# Patient Record
Sex: MALE | Race: BLACK OR AFRICAN AMERICAN | Employment: FULL TIME | URBAN - METROPOLITAN AREA
[De-identification: names, ages, dates, MRNs, and addresses within clinical notes are randomized per-mention and may not be internally consistent; named-entity substitution may affect disease eponyms.]

---

## 2018-12-10 ENCOUNTER — OFFICE VISIT (OUTPATIENT)
Dept: INTERNAL MEDICINE | Age: 48
End: 2018-12-10
Payer: MEDICARE

## 2018-12-10 VITALS
WEIGHT: 215.2 LBS | SYSTOLIC BLOOD PRESSURE: 192 MMHG | BODY MASS INDEX: 33.78 KG/M2 | TEMPERATURE: 98.5 F | HEART RATE: 103 BPM | DIASTOLIC BLOOD PRESSURE: 100 MMHG | HEIGHT: 67 IN | RESPIRATION RATE: 16 BRPM

## 2018-12-10 DIAGNOSIS — E78.00 HYPERCHOLESTEREMIA: ICD-10-CM

## 2018-12-10 DIAGNOSIS — E08.00 DIABETES MELLITUS DUE TO UNDERLYING CONDITION WITH HYPEROSMOLARITY WITHOUT COMA, WITHOUT LONG-TERM CURRENT USE OF INSULIN (HCC): Primary | ICD-10-CM

## 2018-12-10 DIAGNOSIS — I10 HYPERTENSION, UNSPECIFIED TYPE: ICD-10-CM

## 2018-12-10 DIAGNOSIS — G47.33 OSA (OBSTRUCTIVE SLEEP APNEA): ICD-10-CM

## 2018-12-10 DIAGNOSIS — M25.619 LIMITED RANGE OF MOTION OF SHOULDER: ICD-10-CM

## 2018-12-10 LAB — HBA1C MFR BLD: 6.4 %

## 2018-12-10 PROCEDURE — 99204 OFFICE O/P NEW MOD 45 MIN: CPT | Performed by: INTERNAL MEDICINE

## 2018-12-10 PROCEDURE — 99202 OFFICE O/P NEW SF 15 MIN: CPT | Performed by: INTERNAL MEDICINE

## 2018-12-10 PROCEDURE — 83036 HEMOGLOBIN GLYCOSYLATED A1C: CPT | Performed by: INTERNAL MEDICINE

## 2018-12-10 RX ORDER — HYDROCHLOROTHIAZIDE 25 MG/1
25 TABLET ORAL DAILY
Qty: 30 TABLET | Refills: 2 | Status: SHIPPED | OUTPATIENT
Start: 2018-12-10 | End: 2019-02-26 | Stop reason: SDUPTHER

## 2018-12-10 RX ORDER — AMLODIPINE BESYLATE 10 MG/1
10 TABLET ORAL DAILY
Qty: 30 TABLET | Refills: 2 | Status: SHIPPED | OUTPATIENT
Start: 2018-12-10 | End: 2019-02-26 | Stop reason: SDUPTHER

## 2018-12-10 ASSESSMENT — ENCOUNTER SYMPTOMS
SORE THROAT: 0
NAUSEA: 0
VOMITING: 0
SHORTNESS OF BREATH: 0
COUGH: 0
DIARRHEA: 0
ABDOMINAL PAIN: 0

## 2018-12-10 NOTE — PROGRESS NOTES
[facility]  InternalMedicine Residency Program  ACC Note      SUBJECTIVE:  CC: had concerns including Diabetes Mellitus (not seen since 2015); Hypertension; Medication Refill; and New Patient. HPI:Ricardo Foss 46 y.o. male with PMH of HTN, T2DM, presents to the [facility] as walk in for meds refill. He was not seen at our clinic for past 3 years and last seen was 2/24/15. He has not been taking any medication. He mentions he had been in [place] for family issue. He denies HA, chest pain, SOB, orthopnea, N/V, focal weakness. Patient's past medical history and medication list reviewed. All questions answered. Review of Systems   Constitutional: Negative for chills and fever. HENT: Negative for sore throat. Respiratory: Negative for cough and shortness of breath. Cardiovascular: Negative for chest pain and leg swelling. Gastrointestinal: Negative for abdominal pain, diarrhea, nausea and vomiting. Genitourinary: Negative for dysuria and frequency. Neurological: Negative for headaches. Current Outpatient Prescriptions on File Prior to Visit   Medication Sig Dispense Refill    glipiZIDE (GLUCOTROL) 10 MG tablet Take 1 tablet by mouth daily. 30 tablet 5    saxagliptin (ONGLYZA) 5 MG TABS tablet Take 1 tablet by mouth daily. 30 tablet 5    Glucose Blood (BLOOD GLUCOSE TEST STRIPS) STRP Test twice daily. 100 strip 5    atenolol (TENORMIN) 50 MG tablet Take 1 tablet by mouth daily. 30 tablet 5    amLODIPine (NORVASC) 10 MG tablet Take 1 tablet by mouth daily. 30 tablet 5    metFORMIN (GLUCOPHAGE) 1000 MG tablet Take 1 tablet by mouth 2 times daily (with meals). 60 tablet 5    hydrochlorothiazide (HYDRODIURIL) 25 MG tablet Take 1 tablet by mouth daily. 30 tablet 5    Omega 3 1000 MG CAPS Take 1 capsule by mouth daily.  90 capsule 5    EPINEPHrine (EPIPEN 2-MCKAY) 0.3 MG/0.3ML SOAJ injection Inject 0.3 mLs into the muscle once as needed (Been Venom allergy) for up to 1

## 2018-12-10 NOTE — PATIENT INSTRUCTIONS
Thank you for coming to your appointment today. Please make sure to do the following:  - New changes in medication:  1. Restart hydrochlorothiazide 25 mg daily and and 5 days later start to take amlodipine 10 mg daily  2. Restart metfromin 500 mg BID 1 month after resume blood pressure medication    - Get labs drawn (Nothing to eat or drink for 12 hours prior to having your blood work done) before next visit  - Have imaging done (left shoulder x ray)  - Schedule your appointments    Follow up as scheduled. Please call with any questions or concerns. [phone]    Patient Education        Elevated Blood Pressure: Care Instructions  Your Care Instructions    Blood pressure is a measure of how hard the blood pushes against the walls of your arteries. It's normal for blood pressure to go up and down throughout the day. But if it stays up over time, you have high blood pressure. Two numbers tell you your blood pressure. The first number is the systolic pressure. It shows how hard the blood pushes when your heart is pumping. The second number is the diastolic pressure. It shows how hard the blood pushes between heartbeats, when your heart is relaxed and filling with blood. An ideal blood pressure in adults is less than 120/80 (say \"120 over 80\"). High blood pressure is 140/90 or higher. You have high blood pressure if your top number is 140 or higher or your bottom number is 90 or higher, or both. The main test for high blood pressure is simple, fast, and painless. To diagnose high blood pressure, your doctor will test your blood pressure at different times. After testing your blood pressure, your doctor may ask you to test it again when you are home. If you are diagnosed with high blood pressure, you can work with your doctor to make a long-term plan to manage it. Follow-up care is a key part of your treatment and safety.  Be sure to make and go to all appointments, and call your doctor if you are having

## 2018-12-16 ENCOUNTER — HOSPITAL ENCOUNTER (OUTPATIENT)
Age: 48
Discharge: HOME | End: 2018-12-16
Payer: MEDICARE

## 2018-12-16 ENCOUNTER — HOSPITAL ENCOUNTER (OUTPATIENT)
Age: 48
Discharge: HOME | End: 2018-12-18
Payer: MEDICARE

## 2018-12-16 ENCOUNTER — HOSPITAL ENCOUNTER (OUTPATIENT)
Dept: GENERAL RADIOLOGY | Age: 48
Discharge: HOME | End: 2018-12-18
Payer: MEDICARE

## 2018-12-16 DIAGNOSIS — M25.619 LIMITED RANGE OF MOTION OF SHOULDER: ICD-10-CM

## 2018-12-16 PROCEDURE — 82044 UR ALBUMIN SEMIQUANTITATIVE: CPT

## 2018-12-16 PROCEDURE — 80053 COMPREHEN METABOLIC PANEL: CPT

## 2018-12-16 PROCEDURE — 36415 COLL VENOUS BLD VENIPUNCTURE: CPT

## 2018-12-16 PROCEDURE — 83036 HEMOGLOBIN GLYCOSYLATED A1C: CPT

## 2018-12-16 PROCEDURE — 82570 ASSAY OF URINE CREATININE: CPT

## 2018-12-16 PROCEDURE — 80061 LIPID PANEL: CPT

## 2018-12-16 PROCEDURE — 73030 X-RAY EXAM OF SHOULDER: CPT

## 2018-12-17 DIAGNOSIS — I10 HYPERTENSION, UNSPECIFIED TYPE: ICD-10-CM

## 2018-12-17 DIAGNOSIS — E78.00 HYPERCHOLESTEREMIA: ICD-10-CM

## 2018-12-17 DIAGNOSIS — E08.00 DIABETES MELLITUS DUE TO UNDERLYING CONDITION WITH HYPEROSMOLARITY WITHOUT COMA, WITHOUT LONG-TERM CURRENT USE OF INSULIN (HCC): ICD-10-CM

## 2018-12-18 LAB
ALBUMIN SERPL-MCNC: 4.3 G/DL (ref 3.5–5.2)
ALP BLD-CCNC: 130 U/L (ref 40–129)
ALT SERPL-CCNC: 6 U/L (ref 0–40)
ANION GAP SERPL CALCULATED.3IONS-SCNC: 14 MMOL/L (ref 7–16)
AST SERPL-CCNC: 11 U/L (ref 0–39)
BILIRUB SERPL-MCNC: 1 MG/DL (ref 0–1.2)
BUN BLDV-MCNC: 21 MG/DL (ref 6–20)
CALCIUM SERPL-MCNC: 9.7 MG/DL (ref 8.6–10.2)
CHLORIDE BLD-SCNC: 102 MMOL/L (ref 98–107)
CHOLESTEROL, TOTAL: 186 MG/DL (ref 0–199)
CO2: 24 MMOL/L (ref 22–29)
CREAT SERPL-MCNC: 1.1 MG/DL (ref 0.7–1.2)
CREATININE URINE: 181 MG/DL (ref 40–278)
GFR AFRICAN AMERICAN: >60
GFR NON-AFRICAN AMERICAN: >60 ML/MIN/1.73
GLUCOSE BLD-MCNC: 148 MG/DL (ref 74–99)
HBA1C MFR BLD: 6.5 % (ref 4–5.6)
HDLC SERPL-MCNC: 65 MG/DL
LDL CHOLESTEROL CALCULATED: 107 MG/DL (ref 0–99)
MICROALBUMIN UR-MCNC: 255.1 MG/L
MICROALBUMIN/CREAT UR-RTO: 140.9 (ref 0–30)
POTASSIUM SERPL-SCNC: 4.1 MMOL/L (ref 3.5–5)
SODIUM BLD-SCNC: 140 MMOL/L (ref 132–146)
TOTAL PROTEIN: 9.2 G/DL (ref 6.4–8.3)
TRIGL SERPL-MCNC: 72 MG/DL (ref 0–149)
VLDLC SERPL CALC-MCNC: 14 MG/DL

## 2019-02-26 ENCOUNTER — OFFICE VISIT (OUTPATIENT)
Dept: INTERNAL MEDICINE | Age: 49
End: 2019-02-26
Payer: MEDICARE

## 2019-02-26 VITALS
WEIGHT: 209 LBS | BODY MASS INDEX: 32.8 KG/M2 | DIASTOLIC BLOOD PRESSURE: 102 MMHG | HEIGHT: 67 IN | RESPIRATION RATE: 16 BRPM | TEMPERATURE: 98.4 F | SYSTOLIC BLOOD PRESSURE: 192 MMHG | HEART RATE: 85 BPM

## 2019-02-26 DIAGNOSIS — M19.112 POST-TRAUMATIC OSTEOARTHRITIS OF LEFT SHOULDER: ICD-10-CM

## 2019-02-26 DIAGNOSIS — I10 HYPERTENSION, UNSPECIFIED TYPE: ICD-10-CM

## 2019-02-26 DIAGNOSIS — E08.00 DIABETES MELLITUS DUE TO UNDERLYING CONDITION WITH HYPEROSMOLARITY WITHOUT COMA, WITHOUT LONG-TERM CURRENT USE OF INSULIN (HCC): Primary | ICD-10-CM

## 2019-02-26 DIAGNOSIS — E78.00 HYPERCHOLESTEREMIA: ICD-10-CM

## 2019-02-26 LAB — HBA1C MFR BLD: 6.9 %

## 2019-02-26 PROCEDURE — 99213 OFFICE O/P EST LOW 20 MIN: CPT | Performed by: INTERNAL MEDICINE

## 2019-02-26 PROCEDURE — 83036 HEMOGLOBIN GLYCOSYLATED A1C: CPT | Performed by: INTERNAL MEDICINE

## 2019-02-26 RX ORDER — IBUPROFEN 600 MG/1
600 TABLET ORAL 4 TIMES DAILY PRN
Qty: 120 TABLET | Refills: 0 | Status: SHIPPED | OUTPATIENT
Start: 2019-02-26

## 2019-02-26 RX ORDER — HYDROCHLOROTHIAZIDE 25 MG/1
25 TABLET ORAL DAILY
Qty: 30 TABLET | Refills: 2 | Status: SHIPPED | OUTPATIENT
Start: 2019-02-26 | End: 2019-06-10 | Stop reason: SDUPTHER

## 2019-02-26 RX ORDER — AMLODIPINE BESYLATE 10 MG/1
10 TABLET ORAL DAILY
Qty: 30 TABLET | Refills: 2 | Status: SHIPPED | OUTPATIENT
Start: 2019-02-26 | End: 2019-06-10 | Stop reason: SDUPTHER

## 2019-02-26 RX ORDER — ATORVASTATIN CALCIUM 40 MG/1
40 TABLET, FILM COATED ORAL DAILY
Qty: 30 TABLET | Refills: 2 | Status: SHIPPED | OUTPATIENT
Start: 2019-02-26 | End: 2019-06-10 | Stop reason: SDUPTHER

## 2019-02-26 ASSESSMENT — PATIENT HEALTH QUESTIONNAIRE - PHQ9
2. FEELING DOWN, DEPRESSED OR HOPELESS: 0
1. LITTLE INTEREST OR PLEASURE IN DOING THINGS: 0
SUM OF ALL RESPONSES TO PHQ QUESTIONS 1-9: 0
SUM OF ALL RESPONSES TO PHQ9 QUESTIONS 1 & 2: 0
SUM OF ALL RESPONSES TO PHQ QUESTIONS 1-9: 0

## 2019-02-26 ASSESSMENT — ENCOUNTER SYMPTOMS
COUGH: 0
NAUSEA: 0
DIARRHEA: 0
ABDOMINAL PAIN: 0
VOMITING: 0
SORE THROAT: 0
SHORTNESS OF BREATH: 0

## 2019-03-04 ENCOUNTER — HOSPITAL ENCOUNTER (OUTPATIENT)
Dept: PHYSICAL THERAPY | Age: 49
Setting detail: THERAPIES SERIES
Discharge: HOME | End: 2019-03-04
Payer: MEDICARE

## 2019-03-04 PROCEDURE — 97110 THERAPEUTIC EXERCISES: CPT | Performed by: PHYSICAL THERAPIST

## 2019-03-04 PROCEDURE — 97161 PT EVAL LOW COMPLEX 20 MIN: CPT | Performed by: PHYSICAL THERAPIST

## 2019-03-04 ASSESSMENT — PAIN DESCRIPTION - LOCATION: LOCATION: SHOULDER

## 2019-03-04 ASSESSMENT — PAIN DESCRIPTION - PAIN TYPE: TYPE: CHRONIC PAIN

## 2019-03-04 ASSESSMENT — PAIN DESCRIPTION - ORIENTATION: ORIENTATION: LEFT

## 2019-03-04 ASSESSMENT — PAIN SCALES - GENERAL: PAINLEVEL_OUTOF10: 3

## 2019-03-04 ASSESSMENT — PAIN DESCRIPTION - DESCRIPTORS: DESCRIPTORS: ACHING

## 2019-03-04 ASSESSMENT — PAIN DESCRIPTION - FREQUENCY: FREQUENCY: INTERMITTENT

## 2019-03-04 ASSESSMENT — PAIN - FUNCTIONAL ASSESSMENT: PAIN_FUNCTIONAL_ASSESSMENT: PREVENTS OR INTERFERES WITH ALL ACTIVE AND SOME PASSIVE ACTIVITIES

## 2019-03-06 ENCOUNTER — HOSPITAL ENCOUNTER (OUTPATIENT)
Dept: PHYSICAL THERAPY | Age: 49
Setting detail: THERAPIES SERIES
Discharge: HOME | End: 2019-03-06
Payer: MEDICARE

## 2019-03-06 PROCEDURE — G0283 ELEC STIM OTHER THAN WOUND: HCPCS | Performed by: PHYSICAL THERAPIST

## 2019-03-06 PROCEDURE — 97110 THERAPEUTIC EXERCISES: CPT | Performed by: PHYSICAL THERAPIST

## 2019-03-11 ENCOUNTER — HOSPITAL ENCOUNTER (OUTPATIENT)
Dept: PHYSICAL THERAPY | Age: 49
Setting detail: THERAPIES SERIES
Discharge: HOME | End: 2019-03-11
Payer: MEDICARE

## 2019-03-11 PROCEDURE — G0283 ELEC STIM OTHER THAN WOUND: HCPCS | Performed by: PHYSICAL THERAPIST

## 2019-03-11 PROCEDURE — 97110 THERAPEUTIC EXERCISES: CPT | Performed by: PHYSICAL THERAPIST

## 2019-03-15 ENCOUNTER — HOSPITAL ENCOUNTER (OUTPATIENT)
Dept: PHYSICAL THERAPY | Age: 49
Setting detail: THERAPIES SERIES
Discharge: HOME | End: 2019-03-15
Payer: MEDICARE

## 2019-03-22 ENCOUNTER — HOSPITAL ENCOUNTER (OUTPATIENT)
Dept: PHYSICAL THERAPY | Age: 49
Setting detail: THERAPIES SERIES
Discharge: HOME | End: 2019-03-22
Payer: MEDICARE

## 2019-03-22 PROCEDURE — 97110 THERAPEUTIC EXERCISES: CPT | Performed by: PHYSICAL THERAPIST

## 2019-03-22 PROCEDURE — G0283 ELEC STIM OTHER THAN WOUND: HCPCS | Performed by: PHYSICAL THERAPIST

## 2019-03-25 ENCOUNTER — HOSPITAL ENCOUNTER (OUTPATIENT)
Dept: PHYSICAL THERAPY | Age: 49
Setting detail: THERAPIES SERIES
Discharge: HOME | End: 2019-03-25
Payer: MEDICARE

## 2019-03-25 PROCEDURE — 97110 THERAPEUTIC EXERCISES: CPT | Performed by: PHYSICAL THERAPIST

## 2019-03-25 PROCEDURE — G0283 ELEC STIM OTHER THAN WOUND: HCPCS | Performed by: PHYSICAL THERAPIST

## 2019-06-10 ENCOUNTER — OFFICE VISIT (OUTPATIENT)
Dept: INTERNAL MEDICINE | Age: 49
End: 2019-06-10
Payer: MEDICARE

## 2019-06-10 VITALS
WEIGHT: 218.1 LBS | HEART RATE: 81 BPM | BODY MASS INDEX: 34.23 KG/M2 | DIASTOLIC BLOOD PRESSURE: 89 MMHG | TEMPERATURE: 98.2 F | RESPIRATION RATE: 16 BRPM | HEIGHT: 67 IN | SYSTOLIC BLOOD PRESSURE: 180 MMHG

## 2019-06-10 DIAGNOSIS — M19.012 OSTEOARTHRITIS OF LEFT SHOULDER, UNSPECIFIED OSTEOARTHRITIS TYPE: Primary | ICD-10-CM

## 2019-06-10 DIAGNOSIS — E08.00 DIABETES MELLITUS DUE TO UNDERLYING CONDITION WITH HYPEROSMOLARITY WITHOUT COMA, WITHOUT LONG-TERM CURRENT USE OF INSULIN (HCC): ICD-10-CM

## 2019-06-10 DIAGNOSIS — I10 HYPERTENSION, UNSPECIFIED TYPE: ICD-10-CM

## 2019-06-10 DIAGNOSIS — E78.00 HYPERCHOLESTEREMIA: ICD-10-CM

## 2019-06-10 PROCEDURE — 99213 OFFICE O/P EST LOW 20 MIN: CPT | Performed by: INTERNAL MEDICINE

## 2019-06-10 PROCEDURE — 99212 OFFICE O/P EST SF 10 MIN: CPT | Performed by: INTERNAL MEDICINE

## 2019-06-10 RX ORDER — LOSARTAN POTASSIUM 25 MG/1
25 TABLET ORAL DAILY
Qty: 30 TABLET | Refills: 1 | Status: SHIPPED | OUTPATIENT
Start: 2019-06-10

## 2019-06-10 RX ORDER — AMLODIPINE BESYLATE 10 MG/1
10 TABLET ORAL DAILY
Qty: 30 TABLET | Refills: 1 | Status: SHIPPED | OUTPATIENT
Start: 2019-06-10

## 2019-06-10 RX ORDER — HYDROCHLOROTHIAZIDE 25 MG/1
25 TABLET ORAL DAILY
Qty: 30 TABLET | Refills: 1 | Status: SHIPPED | OUTPATIENT
Start: 2019-06-10

## 2019-06-10 RX ORDER — ATORVASTATIN CALCIUM 40 MG/1
40 TABLET, FILM COATED ORAL DAILY
Qty: 30 TABLET | Refills: 1 | Status: SHIPPED | OUTPATIENT
Start: 2019-06-10

## 2019-06-10 ASSESSMENT — ENCOUNTER SYMPTOMS
VOMITING: 0
DIARRHEA: 0
COUGH: 0
NAUSEA: 0
SHORTNESS OF BREATH: 0
SORE THROAT: 0
ABDOMINAL PAIN: 0

## 2019-06-10 NOTE — PROGRESS NOTES
[facility]  Internal Medicine Clinic    Attending Physician Statement:    I have discussed the case, including pertinent history and exam findings with the resident. I agree with the assessment, plan and orders as documented by the resident. Patient here for routine follow up of medical problems. HTN - BP out of control on his last visit and this visit as well - currently taking amlodipine and HCTZ intermittently - we discussed the RBA of letting his BP stat at this level and that we cannot in good medical practice continue to do so. We will Rx a ARB it will be his choice to follow our instruction on his BP management. He understands that if he continues to lave his BP at this level it is likely to lead to a stroke or death. DM - currently taking metformin - a1c 6.9    HLD - high ascvd risk score - not taking statin    Remainder of medical problems as per resident note.   Letty El D.O.  6/10/2019 9:06 AM

## 2019-06-10 NOTE — PROGRESS NOTES
[facility]  InternalMedicine Residency Program  ACC Note      SUBJECTIVE:  CC: had concerns including 3 Month Follow-Up; Hypertension; Diabetes Mellitus; Medication Refill; and Sinus Problem. Jeff Ochoa 44 y.o. male with PMH of HTN, T2DM, SMITA, left shoulder OA, tobacco abuse presents to the [facility] for routine visit and meds refill. He cw chronic lower back pain and right knee pain. He states it has been years. He tried tylenol and ibuprofen with minimal help. He reports he did physical therapy years ago which helped. Patient's past medical history and medication list reviewed. Lab results reviewed. All questions answered. Review of Systems   Constitutional: Negative for chills and fever. HENT: Negative for sore throat. Respiratory: Negative for cough and shortness of breath. Cardiovascular: Negative for chest pain and leg swelling. Gastrointestinal: Negative for abdominal pain, diarrhea, nausea and vomiting. Genitourinary: Negative for dysuria and frequency. Musculoskeletal: Positive for arthralgias (left shoulder). Neurological: Negative for headaches. Current Outpatient Medications on File Prior to Visit   Medication Sig Dispense Refill    amLODIPine (NORVASC) 10 MG tablet Take 1 tablet by mouth daily 30 tablet 2    hydrochlorothiazide (HYDRODIURIL) 25 MG tablet Take 1 tablet by mouth daily 30 tablet 2    metFORMIN (GLUCOPHAGE) 500 MG tablet Take 1 tablet by mouth 2 times daily (with meals) 60 tablet 2    atorvastatin (LIPITOR) 40 MG tablet Take 1 tablet by mouth daily 30 tablet 2    ibuprofen (ADVIL;MOTRIN) 600 MG tablet Take 1 tablet by mouth 4 times daily as needed for Pain 120 tablet 0     No current facility-administered medications on file prior to visit.         OBJECTIVE:    VS: BP (!) 200/98   Pulse 81   Temp 98.2 °F (36.8 °C) (Oral)   Resp 16   Ht 5' 7\" (1.702 m)   Wt 218 lb 1.6 oz (98.9 kg)   BMI 34.16 kg/m²   Physical Exam Constitutional: He appears well-nourished. HENT:   Head: Normocephalic. Mouth/Throat: Oropharynx is clear and moist.   Neck: Normal range of motion. Neck supple. Cardiovascular: Normal rate, regular rhythm and normal heart sounds. No murmur heard. Pulmonary/Chest: Breath sounds normal. He has no wheezes. He has no rales. Abdominal: Soft. Bowel sounds are normal. He exhibits no distension. There is no tenderness. Lymphadenopathy:     He has no cervical adenopathy. Neurological: He is alert. Skin: Skin is warm and dry. Limited active and passive range of motion in left shoulder        ASSESSMENT/PLAN:  Osmel Bruce was seen today for 3 month follow-up, hypertension, diabetes mellitus, medication refill and sinus problem. T2DM  - POCT HbA1c 6.9  - Cont metformin 500 mg BID   - Microabuminuria 255, and miroalbumin/cr 140     Hypertension, uncontrolled  - /89, goal 130/80  - Was amlodipine 10 mg, atenolol 50 mg, hydrochlorothiazide 25 mg in the past  - Cont HCTZ 25 mg and amlodipine 10 mg   - He said \"I am not going to take any additional medications or change medications. I am not going to let doctors do experiment on me. \" He agreed to take medications in the morning next appointment and he would try to measure BP at home and write the log book  - Start losartan 25 mg although pt refuse to take it, explained risk and benefit  - Explained if he still not compliance and bp not controlled, pt might need to see other provider   - BMP in 1 months     SMITA (obstructive sleep apnea)  - Non compliance with CPAP  - Educated importance of compliance     HLD  - Lipid panel 12/18/19   - ASCVD risk score is: 44.8%  - Did not compliance atorvastatin 40 mg  - Educated compliance, still refuse  - Prescribe Lipitor again     Left shoulder OA  - No tenderness or swelling  - X-ray showed mild to moderate degenerative changes of the left shoulder  - Ibuprofen OTC prn  - Finished PT, he said it helped, they made referral to osteopathic, but he refused     Tobacco abuse  - Active smoker  - 3 year pack history, 1 pack/3-4 days  - Smoking cessation    Marijuana abuse  - Asked to prescribe medical marijuana, explained we don't prescribe in the clinic  - Amy 6 maintainance  - He states he follows Dr. Regis Ramachandran, will obtain records   - Refues eye exam   - Refuse colonoscopy   - Pt refuses to have any vaccination     RTC:  in 3 months. Call in if having any questions.     I have reviewed my findings and recommendations with Harleen Bullock and Dr Raiza Henderson MD PGY-2  6/10/2019 8:20 AM

## 2019-08-05 ENCOUNTER — TELEPHONE (OUTPATIENT)
Dept: INTERNAL MEDICINE | Age: 49
End: 2019-08-05

## 2021-04-26 ENCOUNTER — HOSPITAL ENCOUNTER (OUTPATIENT)
Age: 51
Discharge: HOME | End: 2021-04-26
Payer: COMMERCIAL

## 2021-04-26 LAB
ALBUMIN SERPL-MCNC: 3.8 G/DL (ref 3.5–5.2)
ALP BLD-CCNC: 115 U/L (ref 40–129)
ALT SERPL-CCNC: 8 U/L (ref 0–40)
ANION GAP SERPL CALCULATED.3IONS-SCNC: 7 MMOL/L (ref 7–16)
AST SERPL-CCNC: 12 U/L (ref 0–39)
BACTERIA: NORMAL /HPF
BILIRUB SERPL-MCNC: 0.7 MG/DL (ref 0–1.2)
BILIRUBIN URINE: NEGATIVE
BLOOD, URINE: NORMAL
BUN BLDV-MCNC: 18 MG/DL (ref 6–20)
CALCIUM SERPL-MCNC: 9.1 MG/DL (ref 8.6–10.2)
CHLORIDE BLD-SCNC: 105 MMOL/L (ref 98–107)
CHOLESTEROL, TOTAL: 165 MG/DL (ref 0–199)
CLARITY: CLEAR
CO2: 28 MMOL/L (ref 22–29)
COLOR: YELLOW
CREAT SERPL-MCNC: 1 MG/DL (ref 0.7–1.2)
GFR AFRICAN AMERICAN: >60
GFR NON-AFRICAN AMERICAN: >60 ML/MIN/1.73
GLUCOSE BLD-MCNC: 132 MG/DL (ref 74–99)
GLUCOSE URINE: NEGATIVE MG/DL
HCT VFR BLD CALC: 45.3 % (ref 37–54)
HDLC SERPL-MCNC: 67 MG/DL
HEMOGLOBIN: 14.9 G/DL (ref 12.5–16.5)
KETONES, URINE: NEGATIVE MG/DL
LDL CHOLESTEROL CALCULATED: 87 MG/DL (ref 0–99)
LEUKOCYTE ESTERASE, URINE: NEGATIVE
MCH RBC QN AUTO: 26.8 PG (ref 26–35)
MCHC RBC AUTO-ENTMCNC: 32.9 % (ref 32–34.5)
MCV RBC AUTO: 81.6 FL (ref 80–99.9)
NITRITE, URINE: NEGATIVE
PDW BLD-RTO: 14 FL (ref 11.5–15)
PH UA: 5.5 (ref 5–9)
PLATELET # BLD: 153 E9/L (ref 130–450)
PMV BLD AUTO: 12.5 FL (ref 7–12)
POTASSIUM SERPL-SCNC: 4.2 MMOL/L (ref 3.5–5)
PROTEIN UA: NORMAL MG/DL
RBC # BLD: 5.55 E12/L (ref 3.8–5.8)
RBC UA: NORMAL /HPF (ref 0–2)
SODIUM BLD-SCNC: 140 MMOL/L (ref 132–146)
SPECIFIC GRAVITY UA: 1.02 (ref 1–1.03)
TOTAL PROTEIN: 8.5 G/DL (ref 6.4–8.3)
TRIGL SERPL-MCNC: 56 MG/DL (ref 0–149)
UROBILINOGEN, URINE: 0.2 E.U./DL
VLDLC SERPL CALC-MCNC: 11 MG/DL
WBC # BLD: 9.5 E9/L (ref 4.5–11.5)
WBC UA: NORMAL /HPF (ref 0–5)

## 2021-04-26 PROCEDURE — 87389 HIV-1 AG W/HIV-1&-2 AB AG IA: CPT

## 2021-04-26 PROCEDURE — 81001 URINALYSIS AUTO W/SCOPE: CPT

## 2021-04-26 PROCEDURE — 84154 ASSAY OF PSA FREE: CPT

## 2021-04-26 PROCEDURE — 80053 COMPREHEN METABOLIC PANEL: CPT

## 2021-04-26 PROCEDURE — 84153 ASSAY OF PSA TOTAL: CPT

## 2021-04-26 PROCEDURE — 80061 LIPID PANEL: CPT

## 2021-04-26 PROCEDURE — 85027 COMPLETE CBC AUTOMATED: CPT

## 2021-04-26 PROCEDURE — 36415 COLL VENOUS BLD VENIPUNCTURE: CPT

## 2021-04-27 LAB
PROSTATE SPECIFIC ANTIGEN FREE: 0.2 NG/ML
PROSTATE SPECIFIC ANTIGEN PERCENT FREE: 20 %
PROSTATE SPECIFIC ANTIGEN: 1 NG/ML (ref 0–4)

## 2021-04-29 ENCOUNTER — HOSPITAL ENCOUNTER (OUTPATIENT)
Age: 51
Discharge: HOME | End: 2021-05-01
Payer: COMMERCIAL

## 2021-04-29 ENCOUNTER — HOSPITAL ENCOUNTER (OUTPATIENT)
Dept: GENERAL RADIOLOGY | Age: 51
Discharge: HOME | End: 2021-05-01
Payer: COMMERCIAL

## 2021-04-29 DIAGNOSIS — R22.2 MASS IN CHEST: ICD-10-CM

## 2021-04-29 LAB
Lab: NORMAL
REPORT: NORMAL
THIS TEST SENT TO: NORMAL

## 2021-04-29 PROCEDURE — 71046 X-RAY EXAM CHEST 2 VIEWS: CPT

## 2021-07-12 ENCOUNTER — HOSPITAL ENCOUNTER (OUTPATIENT)
Dept: NON INVASIVE DIAGNOSTICS | Age: 51
Discharge: HOME | End: 2021-07-12
Payer: COMMERCIAL

## 2021-07-12 LAB
LEFT VENTRICULAR EJECTION FRACTION HIGH VALUE: 40 %
LEFT VENTRICULAR EJECTION FRACTION MODE: NORMAL
LV EF: 35 %
LV EF: 38 %
LVEF MODALITY: NORMAL

## 2021-07-12 PROCEDURE — 93306 TTE W/DOPPLER COMPLETE: CPT

## 2024-05-16 ENCOUNTER — OFFICE VISIT (OUTPATIENT)
Dept: CARDIOLOGY CLINIC | Age: 54
End: 2024-05-16
Payer: COMMERCIAL

## 2024-05-16 VITALS
HEIGHT: 67 IN | BODY MASS INDEX: 34.56 KG/M2 | HEART RATE: 71 BPM | SYSTOLIC BLOOD PRESSURE: 144 MMHG | WEIGHT: 220.2 LBS | RESPIRATION RATE: 18 BRPM | DIASTOLIC BLOOD PRESSURE: 74 MMHG

## 2024-05-16 DIAGNOSIS — I10 HYPERTENSION, UNSPECIFIED TYPE: Primary | Chronic | ICD-10-CM

## 2024-05-16 DIAGNOSIS — I42.9 CARDIOMYOPATHY, UNSPECIFIED TYPE (HCC): Primary | ICD-10-CM

## 2024-05-16 DIAGNOSIS — I42.9 CARDIOMYOPATHY, UNSPECIFIED TYPE (HCC): ICD-10-CM

## 2024-05-16 PROCEDURE — 99203 OFFICE O/P NEW LOW 30 MIN: CPT | Performed by: INTERNAL MEDICINE

## 2024-05-16 PROCEDURE — 4004F PT TOBACCO SCREEN RCVD TLK: CPT | Performed by: INTERNAL MEDICINE

## 2024-05-16 PROCEDURE — G8417 CALC BMI ABV UP PARAM F/U: HCPCS | Performed by: INTERNAL MEDICINE

## 2024-05-16 PROCEDURE — G8427 DOCREV CUR MEDS BY ELIG CLIN: HCPCS | Performed by: INTERNAL MEDICINE

## 2024-05-16 PROCEDURE — 93000 ELECTROCARDIOGRAM COMPLETE: CPT | Performed by: INTERNAL MEDICINE

## 2024-05-16 PROCEDURE — 3017F COLORECTAL CA SCREEN DOC REV: CPT | Performed by: INTERNAL MEDICINE

## 2024-05-16 PROCEDURE — 3077F SYST BP >= 140 MM HG: CPT | Performed by: INTERNAL MEDICINE

## 2024-05-16 PROCEDURE — 3078F DIAST BP <80 MM HG: CPT | Performed by: INTERNAL MEDICINE

## 2024-05-16 RX ORDER — METOPROLOL SUCCINATE 25 MG/1
25 TABLET, EXTENDED RELEASE ORAL
COMMUNITY

## 2024-05-16 ASSESSMENT — ENCOUNTER SYMPTOMS
COUGH: 0
BACK PAIN: 0
ABDOMINAL PAIN: 0
SHORTNESS OF BREATH: 0
WHEEZING: 0
CONSTIPATION: 0
DIARRHEA: 0
VOMITING: 0
NAUSEA: 0

## 2024-05-16 NOTE — PROGRESS NOTES
OUTPATIENT CARDIOLOGY CONSULT    Name: Ricardo Foss    Age: 54 y.o.    Date of Service: 5/16/2024    Reason for Consultation:   Chief Complaint   Patient presents with    Hypertension        Referring Physician: Wali Jenkins MD    History of Present Illness:  54-year-old obese chronic smoker -American male who is referred for cardiac evaluation due to hypertension.  He has history of hypertension, hyperlipidemia, diabetes, and obstructive sleep apnea.     Patient smokes 1 pack a week.  He has not been using his CPAP due to fear of power outage while sleeping.  He is active.  He can go up 21 steps with no chest discomfort or dyspnea on exertion.  He denies orthopnea, PND or lower extremity edema.  He denies palpitations, dizziness or syncope.  He drinks 1 pint of Lorrie on the weekend.    EKG done today revealed sinus rhythm at 71 bpm, left atrial enlargement, poor R wave progression, left ventricular hypertrophy with strain, cannot rule ischemia and frontal leads misplacement.    Review of Systems:  Review of Systems   Constitutional:  Negative for chills, fatigue and fever.   HENT:  Negative for nosebleeds.    Respiratory:  Negative for cough, shortness of breath and wheezing.         He denies gasping for air at night.   Gastrointestinal:  Negative for abdominal pain, blood in stool, constipation, diarrhea, nausea and vomiting.   Genitourinary:  Negative for dysuria and hematuria.   Musculoskeletal:  Negative for back pain, joint swelling and myalgias.   Neurological:  Negative for syncope and light-headedness.   Psychiatric/Behavioral:  The patient is not nervous/anxious.           Past Medical History:  Past Medical History:   Diagnosis Date    Hyperlipidemia     Hypertension     Type II or unspecified type diabetes mellitus without mention of complication, not stated as uncontrolled     Hx [facility] in 10/2010 HbA1C 13    Unspecified sleep apnea 2005    using CPAP 14cm H2O       Past Surgical History:  No

## 2024-05-20 ENCOUNTER — TELEPHONE (OUTPATIENT)
Dept: OTHER | Age: 54
End: 2024-05-20

## 2024-05-21 ENCOUNTER — TELEPHONE (OUTPATIENT)
Dept: OTHER | Age: 54
End: 2024-05-21

## 2024-05-21 NOTE — TELEPHONE ENCOUNTER
Called patient to schedule for consult with the CHF clinic. No answer. Left voicemail requesting return call.

## 2024-08-01 ENCOUNTER — TELEPHONE (OUTPATIENT)
Dept: CARDIOLOGY | Age: 54
End: 2024-08-01

## 2024-08-01 NOTE — TELEPHONE ENCOUNTER
OBED for patient x2 to let him know his echo is being canceled until authorization approves,    If he has any questions, instructed him to call me.    Electronically signed by Akiko Meier on 8/1/2024 at 11:45 AM

## 2024-11-12 ENCOUNTER — OFFICE VISIT (OUTPATIENT)
Dept: CARDIOLOGY CLINIC | Age: 54
End: 2024-11-12
Payer: COMMERCIAL

## 2024-11-12 VITALS
DIASTOLIC BLOOD PRESSURE: 76 MMHG | BODY MASS INDEX: 33.91 KG/M2 | SYSTOLIC BLOOD PRESSURE: 146 MMHG | HEIGHT: 66 IN | RESPIRATION RATE: 18 BRPM | WEIGHT: 211 LBS | HEART RATE: 62 BPM

## 2024-11-12 DIAGNOSIS — I42.9 CARDIOMYOPATHY, UNSPECIFIED TYPE (HCC): ICD-10-CM

## 2024-11-12 DIAGNOSIS — I10 HYPERTENSION, UNSPECIFIED TYPE: Primary | ICD-10-CM

## 2024-11-12 PROCEDURE — 3078F DIAST BP <80 MM HG: CPT | Performed by: INTERNAL MEDICINE

## 2024-11-12 PROCEDURE — 93000 ELECTROCARDIOGRAM COMPLETE: CPT | Performed by: INTERNAL MEDICINE

## 2024-11-12 PROCEDURE — 4004F PT TOBACCO SCREEN RCVD TLK: CPT | Performed by: INTERNAL MEDICINE

## 2024-11-12 PROCEDURE — G8427 DOCREV CUR MEDS BY ELIG CLIN: HCPCS | Performed by: INTERNAL MEDICINE

## 2024-11-12 PROCEDURE — 99214 OFFICE O/P EST MOD 30 MIN: CPT | Performed by: INTERNAL MEDICINE

## 2024-11-12 PROCEDURE — G8484 FLU IMMUNIZE NO ADMIN: HCPCS | Performed by: INTERNAL MEDICINE

## 2024-11-12 PROCEDURE — 3077F SYST BP >= 140 MM HG: CPT | Performed by: INTERNAL MEDICINE

## 2024-11-12 PROCEDURE — G8417 CALC BMI ABV UP PARAM F/U: HCPCS | Performed by: INTERNAL MEDICINE

## 2024-11-12 PROCEDURE — 3017F COLORECTAL CA SCREEN DOC REV: CPT | Performed by: INTERNAL MEDICINE

## 2024-11-12 RX ORDER — DOXAZOSIN 4 MG/1
4 TABLET ORAL NIGHTLY
COMMUNITY

## 2024-11-12 ASSESSMENT — ENCOUNTER SYMPTOMS
WHEEZING: 0
VOMITING: 0
BLOOD IN STOOL: 0
NAUSEA: 0
BACK PAIN: 0
DIARRHEA: 0
ABDOMINAL PAIN: 0
COUGH: 0
CONSTIPATION: 0
SHORTNESS OF BREATH: 0

## 2024-11-12 NOTE — PROGRESS NOTES
MD Sonia(Interpreting physician) on 07/12/2021     ASSESSMENT / PLAN:  -Cardiomyopathy with mild to moderate systolic dysfunction and grade 2 diastolic dysfunction: The cardiomyopathy could be due to CAD, alcohol abuse, untreated obstructive sleep apnea or longstanding hypertension.  -Moderate mitral regurgitation and mild to moderate aortic regurgitation.  -Hypertension: Mildly elevated but could be exacerbated by whitecoat hypertension.  -Diabetes.  -Hyperlipidemia: Off atorvastatin !  -Obstructive sleep apnea not treated with CPAP at night.  -Morbid obesity.  -Chronic smoking.  -Alcohol abuse.  -Noncompliance.    I will continue patient on his current cardiac medications  Patient was advised to have low-salt diet and to lose weight  Patient was advised to treat obstructive sleep apnea  Patient was advised to quit smoking and alcohol abuse  I will arrange for the patient to have an echocardiogram to reassess ejection fraction and follow-up his mitral and aortic regurgitation  I will refer him to CHF clinic  Patient will be followed up by an nurse practitioner in 1 year      The patient's current medication list, allergies, problem list and results of all previously ordered testing were reviewed at today's visit.      TUCKER GATES MD , FACC, [facility].  [facility]

## 2024-11-19 ENCOUNTER — TELEPHONE (OUTPATIENT)
Dept: CARDIOLOGY | Age: 54
End: 2024-11-19

## 2024-11-19 NOTE — TELEPHONE ENCOUNTER
CALLED PATIENT 1X AND LEFT MESSAGE TO SCHEDULE ECHO    Electronically signed by Candace Joshi on 11/19/2024 at 11:00 AM

## 2024-12-02 ENCOUNTER — HOSPITAL ENCOUNTER (OUTPATIENT)
Dept: OTHER | Age: 54
Discharge: HOME | End: 2024-12-02

## 2024-12-02 NOTE — FLOWSHEET NOTE
Patient a no show for today's appointment despite reminder call. Called patient, no answer. Left voicemail requesting return call to reschedule.

## 2024-12-12 ENCOUNTER — HOSPITAL ENCOUNTER (OUTPATIENT)
Dept: OTHER | Age: 54
Setting detail: THERAPIES SERIES
Discharge: HOME | End: 2024-12-12
Payer: COMMERCIAL

## 2024-12-12 VITALS
RESPIRATION RATE: 18 BRPM | DIASTOLIC BLOOD PRESSURE: 72 MMHG | BODY MASS INDEX: 35.51 KG/M2 | OXYGEN SATURATION: 99 % | WEIGHT: 220 LBS | HEART RATE: 69 BPM | SYSTOLIC BLOOD PRESSURE: 160 MMHG

## 2024-12-12 LAB
ANION GAP SERPL CALCULATED.3IONS-SCNC: 10 MMOL/L (ref 7–16)
BNP SERPL-MCNC: 106 PG/ML (ref 0–125)
BUN SERPL-MCNC: 14 MG/DL (ref 6–20)
CALCIUM SERPL-MCNC: 9.1 MG/DL (ref 8.6–10.2)
CHLORIDE SERPL-SCNC: 107 MMOL/L (ref 98–107)
CO2 SERPL-SCNC: 24 MMOL/L (ref 22–29)
CREAT SERPL-MCNC: 0.9 MG/DL (ref 0.7–1.2)
GFR, ESTIMATED: >90 ML/MIN/1.73M2
GLUCOSE SERPL-MCNC: 91 MG/DL (ref 74–99)
POTASSIUM SERPL-SCNC: 3.8 MMOL/L (ref 3.5–5)
SODIUM SERPL-SCNC: 141 MMOL/L (ref 132–146)

## 2024-12-12 PROCEDURE — 99204 OFFICE O/P NEW MOD 45 MIN: CPT

## 2024-12-12 PROCEDURE — 80048 BASIC METABOLIC PNL TOTAL CA: CPT

## 2024-12-12 PROCEDURE — 83880 ASSAY OF NATRIURETIC PEPTIDE: CPT

## 2024-12-12 RX ORDER — MULTIVIT-MIN/IRON/FOLIC ACID/K 18-600-40
2000 CAPSULE ORAL DAILY
COMMUNITY

## 2024-12-12 RX ORDER — HYDRALAZINE HYDROCHLORIDE 25 MG/1
25 TABLET, FILM COATED ORAL 3 TIMES DAILY
Qty: 90 TABLET | Refills: 3 | Status: SHIPPED | OUTPATIENT
Start: 2024-12-12

## 2024-12-12 RX ORDER — CHLORTHALIDONE 25 MG/1
25 TABLET ORAL DAILY
COMMUNITY

## 2024-12-12 RX ORDER — ROSUVASTATIN CALCIUM 10 MG/1
10 TABLET, COATED ORAL DAILY
COMMUNITY

## 2024-12-12 RX ORDER — LOSARTAN POTASSIUM 50 MG/1
50 TABLET ORAL DAILY
Qty: 90 TABLET | Refills: 3 | Status: SHIPPED
Start: 2024-12-12 | End: 2024-12-12 | Stop reason: SDUPTHER

## 2024-12-12 RX ORDER — LOSARTAN POTASSIUM 100 MG/1
100 TABLET ORAL DAILY
Qty: 90 TABLET | Refills: 3 | Status: SHIPPED | OUTPATIENT
Start: 2024-12-12

## 2024-12-12 ASSESSMENT — PATIENT HEALTH QUESTIONNAIRE - PHQ9
SUM OF ALL RESPONSES TO PHQ QUESTIONS 1-9: 0
2. FEELING DOWN, DEPRESSED OR HOPELESS: NOT AT ALL
SUM OF ALL RESPONSES TO PHQ QUESTIONS 1-9: 0
SUM OF ALL RESPONSES TO PHQ9 QUESTIONS 1 & 2: 0
1. LITTLE INTEREST OR PLEASURE IN DOING THINGS: NOT AT ALL

## 2024-12-12 NOTE — PROGRESS NOTES
Congestive Heart Failure Clinic   [facility]      Referring Provider: DR. GATES  Primary Care Physicidan: Yary Driver, APRN - CNP   Cardiologist: DR. GATES  Nephrologist: N/A      HISTORY OF PRESENT ILLNESS:     Ricardo Foss is a 54 y.o. (1970) male with a history of HFrEF (EF < 40%)  Pre Cupid:     Lab Results   Component Value Date    LVEF 38 07/12/2021     Post Cupid:  No results found for: \"EFBP\"      He presents to the CHF clinic for ongoing evaluation and monitoring of heart failure.    In the CHF clinic today he denies any adverse symptoms except:  [] Dizziness or lightheadedness   [] Syncope or near syncope  [] Recent Fall  [] Shortness of breath at rest   [x] Dyspnea with exertion  [] Decline in functional capacity (unable to perform activities they had previously been able to do)  [] Fatigue   [] Orthopnea  [] PND  [] Nocturnal cough  [] Hemoptysis  [] Chest pain, pressure, or discomfort  [] Palpitations  [] Abdominal distention  [] Abdominal bloating  [] Early satiety  [] Blood in stool   [] Diarrhea  [] Constipation  [] Nausea/Vomiting  [] Decreased urinary response to oral diuretic   [] Scrotal swelling   [] Lower extremity edema  [] Used PRN doses of oral diuretic   [] Weight gain    Wt Readings from Last 3 Encounters:   12/12/24 99.8 kg (220 lb)   11/12/24 95.7 kg (211 lb)   05/16/24 99.9 kg (220 lb 3.2 oz)           SOCIAL HISTORY:  [] Denies tobacco, alcohol or illicit drug abuse  [x] Tobacco use:  [x] ETOH use:SOCIALLY  [] Illicit drug use:        MEDICATIONS:    Allergies   Allergen Reactions    Lisinopril Swelling     angioedema    Bee Venom Swelling     Prior to Visit Medications    Medication Sig Taking? Authorizing Provider   doxazosin (CARDURA) 4 MG tablet Take 1 tablet by mouth nightly Yes Provider, MD Mavis   losartan (COZAAR) 25 MG tablet Take 1 tablet by mouth daily Yes Wali Jenkins MD   metoprolol succinate

## 2024-12-12 NOTE — RESULT ENCOUNTER NOTE
Labs and CHF clinic note reviewed  Vitals: 160/72, 69    Current GDMT:  Toprol 25 mg BID  Losartan 100 mg daily   Norvasc 10 mg daily     Start hydralazine 25 mg TID  Follow up in CHF clinic in 1-2 weeks

## 2024-12-12 NOTE — PLAN OF CARE
Problem: Chronic Conditions and Co-morbidities  Goal: Patient's chronic conditions and co-morbidity symptoms are monitored and maintained or improved  Flowsheets (Taken 12/12/2024 3989)  Care Plan - Patient's Chronic Conditions and Co-Morbidity Symptoms are Monitored and Maintained or Improved: Monitor and assess patient's chronic conditions and comorbid symptoms for stability, deterioration, or improvement

## 2024-12-12 NOTE — PROGRESS NOTES
Marco VELASCO-   VOICE MESSAGE LEFT :  Start Hydralazine 25 mg TID and return to CHF clinic on 12/23/24 at 7:40 AM    12/13/24 Spoke to Pt. Regarding above orders, pt. Repeated orders and verbalized understanding.

## 2024-12-16 ENCOUNTER — TELEPHONE (OUTPATIENT)
Dept: OTHER | Age: 54
End: 2024-12-16

## 2024-12-16 NOTE — TELEPHONE ENCOUNTER
CHF CHW Initial Call  12/16/2024  9:54 AM    CHW received referral from Litzy Goff RN.  Patient need: prior auth for Jardiance  Notes: CHW called patient to assist with prior auth for Jardiance. It was approved on 12/14/24. I called pt to let him know this information. Pt did not answer. I left a VM and told him this information and to  medication at the pharmacy. I also said if co pay is high to call CHF clinic to get samples.      Patient in agreement with plan and further assistance.  [x] Agreed    [] Declined      CHW informed patient of the services and resources that can be provided to them. CHW instructed patient to call CHF CHW at [phone] for any future assistance.     Electronically signed by Iliana Christianson on 12/16/2024 at 9:54 AM

## 2024-12-23 ENCOUNTER — HOSPITAL ENCOUNTER (OUTPATIENT)
Dept: OTHER | Age: 54
Setting detail: THERAPIES SERIES
Discharge: HOME | End: 2024-12-23
Payer: COMMERCIAL

## 2024-12-23 VITALS
BODY MASS INDEX: 35.19 KG/M2 | OXYGEN SATURATION: 100 % | DIASTOLIC BLOOD PRESSURE: 74 MMHG | RESPIRATION RATE: 18 BRPM | HEART RATE: 68 BPM | SYSTOLIC BLOOD PRESSURE: 151 MMHG | WEIGHT: 218 LBS

## 2024-12-23 LAB
ANION GAP SERPL CALCULATED.3IONS-SCNC: 8 MMOL/L (ref 7–16)
BNP SERPL-MCNC: 118 PG/ML (ref 0–125)
BUN SERPL-MCNC: 17 MG/DL (ref 6–20)
CALCIUM SERPL-MCNC: 9.1 MG/DL (ref 8.6–10.2)
CHLORIDE SERPL-SCNC: 107 MMOL/L (ref 98–107)
CO2 SERPL-SCNC: 25 MMOL/L (ref 22–29)
CREAT SERPL-MCNC: 1 MG/DL (ref 0.7–1.2)
GFR, ESTIMATED: >90 ML/MIN/1.73M2
GLUCOSE SERPL-MCNC: 138 MG/DL (ref 74–99)
POTASSIUM SERPL-SCNC: 3.9 MMOL/L (ref 3.5–5)
SODIUM SERPL-SCNC: 140 MMOL/L (ref 132–146)

## 2024-12-23 PROCEDURE — 83880 ASSAY OF NATRIURETIC PEPTIDE: CPT

## 2024-12-23 PROCEDURE — 80048 BASIC METABOLIC PNL TOTAL CA: CPT

## 2024-12-23 PROCEDURE — 99214 OFFICE O/P EST MOD 30 MIN: CPT

## 2024-12-23 RX ORDER — ISOSORBIDE DINITRATE 10 MG/1
10 TABLET ORAL 3 TIMES DAILY
Qty: 90 TABLET | Refills: 3 | Status: SHIPPED | OUTPATIENT
Start: 2024-12-23

## 2024-12-23 NOTE — PROGRESS NOTES
Congestive Heart Failure Clinic   [facility]      Referring Provider: DR. GATES  Primary Care Physicidan: Yary Driver APRN - CNP   Cardiologist: DR. GATES  Nephrologist: N/A      HISTORY OF PRESENT ILLNESS:     Ricardo Foss is a 54 y.o. (1970) male with a history of HFrEF (EF < 40%)  Pre Cupid:     Lab Results   Component Value Date    LVEF 38 07/12/2021     Post Cupid:  No results found for: \"EFBP\"      He presents to the CHF clinic for ongoing evaluation and monitoring of heart failure.    In the CHF clinic today he denies any adverse symptoms except:  [] Dizziness or lightheadedness   [] Syncope or near syncope  [] Recent Fall  [] Shortness of breath at rest   [] Dyspnea with exertion  [] Decline in functional capacity (unable to perform activities they had previously been able to do)  [] Fatigue   [] Orthopnea  [] PND  [] Nocturnal cough  [] Hemoptysis  [] Chest pain, pressure, or discomfort  [] Palpitations  [] Abdominal distention  [] Abdominal bloating  [] Early satiety  [] Blood in stool   [] Diarrhea  [] Constipation  [] Nausea/Vomiting  [] Decreased urinary response to oral diuretic   [] Scrotal swelling   [x] Lower extremity edema (Trace)  [] Used PRN doses of oral diuretic   [] Weight gain    Wt Readings from Last 3 Encounters:   12/23/24 98.9 kg (218 lb)   12/12/24 99.8 kg (220 lb)   11/12/24 95.7 kg (211 lb)           SOCIAL HISTORY:  [] Denies tobacco, alcohol or illicit drug abuse  [x] Tobacco use:  [x] ETOH use:SOCIALLY  [] Illicit drug use:        MEDICATIONS:    Allergies   Allergen Reactions    Lisinopril Swelling     angioedema    Bee Venom Swelling     Prior to Visit Medications    Medication Sig Taking? Authorizing Provider   hydrALAZINE (APRESOLINE) 25 MG tablet Take 1 tablet by mouth 3 times daily Yes Carolyn Gaspar APRN - CNP   chlorthalidone (HYGROTON) 25 MG tablet Take 1 tablet by mouth daily  Provider,

## 2024-12-23 NOTE — PROGRESS NOTES
Carolyn Gaspar APRN - Maddi Vieira RN  Labs and CHF clinic note reviewed  Vitals: 151/74, 68    Current GDMT:  Toprol 25 mg BID  Losartan 100 mg daily    Hydralazine 25 mg TID  Norvasc 10 mg daily     Start isordil 10 mg TID    Follow up in CHF clinic as scheduled next week      Left voice message for patient to start taking Isordil 10 mg TID, per LAKHWINDER Jaramillo. Asked patient to call CHF Clinic with any questions or concerns.

## 2024-12-23 NOTE — RESULT ENCOUNTER NOTE
Labs and CHF clinic note reviewed  Vitals: 151/74, 68    Current GDMT:  Toprol 25 mg BID  Losartan 100 mg daily    Hydralazine 25 mg TID  Norvasc 10 mg daily     Start isordil 10 mg TID    Follow up in CHF clinic as scheduled next week     Thank you

## 2024-12-23 NOTE — PLAN OF CARE
Problem: Chronic Conditions and Co-morbidities  Goal: Patient's chronic conditions and co-morbidity symptoms are monitored and maintained or improved  Flowsheets (Taken 12/23/2024 5809)  Care Plan - Patient's Chronic Conditions and Co-Morbidity Symptoms are Monitored and Maintained or Improved: Monitor and assess patient's chronic conditions and comorbid symptoms for stability, deterioration, or improvement

## 2025-01-03 ENCOUNTER — HOSPITAL ENCOUNTER (OUTPATIENT)
Dept: OTHER | Age: 55
Setting detail: THERAPIES SERIES
Discharge: HOME | End: 2025-01-03

## 2025-03-07 ENCOUNTER — TRANSCRIBE ORDERS (OUTPATIENT)
Dept: ADMINISTRATIVE | Age: 55
End: 2025-03-07

## 2025-03-07 DIAGNOSIS — R22.0 FACIAL SWELLING: Primary | ICD-10-CM

## 2025-03-13 ENCOUNTER — HOSPITAL ENCOUNTER (EMERGENCY)
Age: 55
Discharge: LEFT BEFORE DISCHARGE | DRG: 155 | End: 2025-03-13
Payer: COMMERCIAL

## 2025-03-13 ENCOUNTER — APPOINTMENT (OUTPATIENT)
Dept: CT IMAGING | Age: 55
DRG: 155 | End: 2025-03-13
Payer: COMMERCIAL

## 2025-03-13 VITALS
SYSTOLIC BLOOD PRESSURE: 158 MMHG | HEART RATE: 74 BPM | BODY MASS INDEX: 35.03 KG/M2 | RESPIRATION RATE: 18 BRPM | TEMPERATURE: 97.3 F | HEIGHT: 66 IN | WEIGHT: 218 LBS | DIASTOLIC BLOOD PRESSURE: 106 MMHG | OXYGEN SATURATION: 98 %

## 2025-03-13 DIAGNOSIS — K11.3 ABSCESS OF PAROTID GLAND: Primary | ICD-10-CM

## 2025-03-13 LAB
ANION GAP SERPL CALCULATED.3IONS-SCNC: 13 MMOL/L (ref 7–16)
BASOPHILS # BLD: 0.07 K/UL (ref 0–0.2)
BASOPHILS NFR BLD: 1 % (ref 0–2)
BUN SERPL-MCNC: 10 MG/DL (ref 6–20)
CALCIUM SERPL-MCNC: 9.4 MG/DL (ref 8.6–10.2)
CHLORIDE SERPL-SCNC: 104 MMOL/L (ref 98–107)
CO2 SERPL-SCNC: 24 MMOL/L (ref 22–29)
CREAT SERPL-MCNC: 1 MG/DL (ref 0.7–1.2)
EOSINOPHIL # BLD: 0.28 K/UL (ref 0.05–0.5)
EOSINOPHILS RELATIVE PERCENT: 2 % (ref 0–6)
ERYTHROCYTE [DISTWIDTH] IN BLOOD BY AUTOMATED COUNT: 14.1 % (ref 11.5–15)
GFR, ESTIMATED: 86 ML/MIN/1.73M2
GLUCOSE SERPL-MCNC: 115 MG/DL (ref 74–99)
HCT VFR BLD AUTO: 38.7 % (ref 37–54)
HGB BLD-MCNC: 12.6 G/DL (ref 12.5–16.5)
IMM GRANULOCYTES # BLD AUTO: 0.05 K/UL (ref 0–0.58)
IMM GRANULOCYTES NFR BLD: 0 % (ref 0–5)
LYMPHOCYTES NFR BLD: 2.12 K/UL (ref 1.5–4)
LYMPHOCYTES RELATIVE PERCENT: 18 % (ref 20–42)
MCH RBC QN AUTO: 26 PG (ref 26–35)
MCHC RBC AUTO-ENTMCNC: 32.6 G/DL (ref 32–34.5)
MCV RBC AUTO: 79.8 FL (ref 80–99.9)
MONOCYTES NFR BLD: 0.76 K/UL (ref 0.1–0.95)
MONOCYTES NFR BLD: 7 % (ref 2–12)
NEUTROPHILS NFR BLD: 72 % (ref 43–80)
NEUTS SEG NFR BLD: 8.28 K/UL (ref 1.8–7.3)
PLATELET # BLD AUTO: 314 K/UL (ref 130–450)
PMV BLD AUTO: 11.4 FL (ref 7–12)
POTASSIUM SERPL-SCNC: 4.2 MMOL/L (ref 3.5–5)
RBC # BLD AUTO: 4.85 M/UL (ref 3.8–5.8)
SODIUM SERPL-SCNC: 141 MMOL/L (ref 132–146)
WBC OTHER # BLD: 11.6 K/UL (ref 4.5–11.5)

## 2025-03-13 PROCEDURE — 96375 TX/PRO/DX INJ NEW DRUG ADDON: CPT

## 2025-03-13 PROCEDURE — 6360000004 HC RX CONTRAST MEDICATION: Performed by: RADIOLOGY

## 2025-03-13 PROCEDURE — 96365 THER/PROPH/DIAG IV INF INIT: CPT

## 2025-03-13 PROCEDURE — 99285 EMERGENCY DEPT VISIT HI MDM: CPT

## 2025-03-13 PROCEDURE — 85025 COMPLETE CBC W/AUTO DIFF WBC: CPT

## 2025-03-13 PROCEDURE — 2580000003 HC RX 258: Performed by: NURSE PRACTITIONER

## 2025-03-13 PROCEDURE — 70491 CT SOFT TISSUE NECK W/DYE: CPT

## 2025-03-13 PROCEDURE — 6360000002 HC RX W HCPCS: Performed by: NURSE PRACTITIONER

## 2025-03-13 PROCEDURE — 80048 BASIC METABOLIC PNL TOTAL CA: CPT

## 2025-03-13 RX ORDER — DEXAMETHASONE SODIUM PHOSPHATE 10 MG/ML
10 INJECTION, SOLUTION INTRAMUSCULAR; INTRAVENOUS ONCE
Status: COMPLETED | OUTPATIENT
Start: 2025-03-13 | End: 2025-03-13

## 2025-03-13 RX ORDER — IOPAMIDOL 755 MG/ML
75 INJECTION, SOLUTION INTRAVASCULAR
Status: COMPLETED | OUTPATIENT
Start: 2025-03-13 | End: 2025-03-13

## 2025-03-13 RX ADMIN — DEXAMETHASONE SODIUM PHOSPHATE 10 MG: 10 INJECTION, SOLUTION INTRAMUSCULAR; INTRAVENOUS at 17:30

## 2025-03-13 RX ADMIN — IOPAMIDOL 75 ML: 755 INJECTION, SOLUTION INTRAVENOUS at 19:38

## 2025-03-13 RX ADMIN — SODIUM CHLORIDE 3000 MG: 9 INJECTION, SOLUTION INTRAVENOUS at 17:34

## 2025-03-13 ASSESSMENT — LIFESTYLE VARIABLES
HOW OFTEN DO YOU HAVE A DRINK CONTAINING ALCOHOL: NEVER
HOW MANY STANDARD DRINKS CONTAINING ALCOHOL DO YOU HAVE ON A TYPICAL DAY: PATIENT DOES NOT DRINK

## 2025-03-13 NOTE — ED PROVIDER NOTES
Independent KEY Visit.     [facility] EMERGENCY DEPARTMENT  EMERGENCY DEPARTMENT ENCOUNTER      Pt Name: Ricardo Foss  MRN: [id number]  Birthdate 1970  Date of evaluation: 3/13/2025  Provider: LAKHWINDER Ulrich CNP  PCP: Yary Driver APRN - CNP  Note Started: 5:18 PM EDT 3/13/25    CHIEF COMPLAINT       Chief Complaint   Patient presents with    Facial Swelling     Left sided facial swelling. Difficulty chewing.        HISTORY OF PRESENT ILLNESS: 1 or more Elements   History From: Patient  Limitations to history : None    Ricardo Foss is a 54 y.o. male who has a past medical history of hyperlipidemia, hypertension, type 2 diabetes mellitus, SMITA presents to the emergency department with a 2-week history of right-sided facial swelling with significant trismus.  Patient states that he had this happen a couple of years ago to him and was evaluated in his regular doctor's office, was thought at that time to be an allergic reaction to lisinopril.  He has not been on ACE inhibitor's.  States that he has a lot of pain and swelling on the right side of his jaw, has not been able to open his mouth due to significant trismus.  States he has not been able to eat much over the last couple of weeks.  Denies any drooling, difficulty swallowing, difficulty breathing.  Denies sore throat.  Denies any dental pain.    Nursing Notes were all reviewed and agreed with or any disagreements were addressed in the HPI.    REVIEW OF SYSTEMS :    Positives and Pertinent negatives as per HPI.     PAST MEDICAL HISTORY/Chronic Conditions Affecting Care    has a past medical history of Hyperlipidemia, Hypertension, Type II or unspecified type diabetes mellitus without mention of complication, not stated as uncontrolled, and Unspecified sleep apnea (2005).     SURGICAL HISTORY   History reviewed. No pertinent surgical history.    CURRENTMEDICATIONS       Previous Medications    AMLODIPINE (NORVASC)

## 2025-03-14 ENCOUNTER — TELEPHONE (OUTPATIENT)
Dept: ENT CLINIC | Age: 55
End: 2025-03-14

## 2025-03-14 ENCOUNTER — HOSPITAL ENCOUNTER (INPATIENT)
Age: 55
LOS: 1 days | Discharge: HOME | DRG: 155 | End: 2025-03-14
Attending: EMERGENCY MEDICINE | Admitting: FAMILY MEDICINE
Payer: COMMERCIAL

## 2025-03-14 VITALS
BODY MASS INDEX: 35.03 KG/M2 | HEIGHT: 66 IN | RESPIRATION RATE: 21 BRPM | DIASTOLIC BLOOD PRESSURE: 80 MMHG | HEART RATE: 71 BPM | TEMPERATURE: 98.2 F | OXYGEN SATURATION: 98 % | SYSTOLIC BLOOD PRESSURE: 147 MMHG | WEIGHT: 218 LBS

## 2025-03-14 DIAGNOSIS — R25.2 TRISMUS: ICD-10-CM

## 2025-03-14 DIAGNOSIS — K11.3 PAROTID ABSCESS: Primary | ICD-10-CM

## 2025-03-14 PROCEDURE — 1200000000 HC SEMI PRIVATE

## 2025-03-14 PROCEDURE — 87070 CULTURE OTHR SPECIMN AEROBIC: CPT

## 2025-03-14 PROCEDURE — 2580000003 HC RX 258: Performed by: FAMILY MEDICINE

## 2025-03-14 PROCEDURE — 0JB10ZZ EXCISION OF FACE SUBCUTANEOUS TISSUE AND FASCIA, OPEN APPROACH: ICD-10-PCS | Performed by: STUDENT IN AN ORGANIZED HEALTH CARE EDUCATION/TRAINING PROGRAM

## 2025-03-14 PROCEDURE — 87205 SMEAR GRAM STAIN: CPT

## 2025-03-14 PROCEDURE — 0J910ZZ DRAINAGE OF FACE SUBCUTANEOUS TISSUE AND FASCIA, OPEN APPROACH: ICD-10-PCS | Performed by: STUDENT IN AN ORGANIZED HEALTH CARE EDUCATION/TRAINING PROGRAM

## 2025-03-14 PROCEDURE — 6360000002 HC RX W HCPCS: Performed by: STUDENT IN AN ORGANIZED HEALTH CARE EDUCATION/TRAINING PROGRAM

## 2025-03-14 PROCEDURE — 99283 EMERGENCY DEPT VISIT LOW MDM: CPT

## 2025-03-14 PROCEDURE — 6360000002 HC RX W HCPCS: Performed by: FAMILY MEDICINE

## 2025-03-14 PROCEDURE — 99223 1ST HOSP IP/OBS HIGH 75: CPT | Performed by: FAMILY MEDICINE

## 2025-03-14 PROCEDURE — 87075 CULTR BACTERIA EXCEPT BLOOD: CPT

## 2025-03-14 PROCEDURE — 87185 SC STD ENZYME DETCJ PER NZM: CPT

## 2025-03-14 RX ORDER — POLYETHYLENE GLYCOL 3350 17 G/17G
17 POWDER, FOR SOLUTION ORAL DAILY PRN
Status: DISCONTINUED | OUTPATIENT
Start: 2025-03-14 | End: 2025-03-14 | Stop reason: HOSPADM

## 2025-03-14 RX ORDER — SODIUM CHLORIDE 0.9 % (FLUSH) 0.9 %
5-40 SYRINGE (ML) INJECTION PRN
Status: DISCONTINUED | OUTPATIENT
Start: 2025-03-14 | End: 2025-03-14 | Stop reason: HOSPADM

## 2025-03-14 RX ORDER — CEPHALEXIN 500 MG/1
500 CAPSULE ORAL 2 TIMES DAILY
Qty: 14 CAPSULE | Refills: 0 | Status: SHIPPED | OUTPATIENT
Start: 2025-03-14 | End: 2025-03-21

## 2025-03-14 RX ORDER — DEXAMETHASONE SODIUM PHOSPHATE 10 MG/ML
10 INJECTION, SOLUTION INTRAMUSCULAR; INTRAVENOUS EVERY 8 HOURS
Status: DISCONTINUED | OUTPATIENT
Start: 2025-03-14 | End: 2025-03-14 | Stop reason: HOSPADM

## 2025-03-14 RX ORDER — ONDANSETRON 4 MG/1
4 TABLET, ORALLY DISINTEGRATING ORAL EVERY 8 HOURS PRN
Status: DISCONTINUED | OUTPATIENT
Start: 2025-03-14 | End: 2025-03-14 | Stop reason: HOSPADM

## 2025-03-14 RX ORDER — SODIUM CHLORIDE 0.9 % (FLUSH) 0.9 %
5-40 SYRINGE (ML) INJECTION EVERY 12 HOURS SCHEDULED
Status: DISCONTINUED | OUTPATIENT
Start: 2025-03-14 | End: 2025-03-14 | Stop reason: HOSPADM

## 2025-03-14 RX ORDER — LIDOCAINE HYDROCHLORIDE AND EPINEPHRINE 10; 10 MG/ML; UG/ML
20 INJECTION, SOLUTION INFILTRATION; PERINEURAL ONCE
Status: COMPLETED | OUTPATIENT
Start: 2025-03-14 | End: 2025-03-14

## 2025-03-14 RX ORDER — ONDANSETRON 2 MG/ML
4 INJECTION INTRAMUSCULAR; INTRAVENOUS EVERY 6 HOURS PRN
Status: DISCONTINUED | OUTPATIENT
Start: 2025-03-14 | End: 2025-03-14 | Stop reason: HOSPADM

## 2025-03-14 RX ORDER — SODIUM CHLORIDE 9 MG/ML
INJECTION, SOLUTION INTRAVENOUS PRN
Status: DISCONTINUED | OUTPATIENT
Start: 2025-03-14 | End: 2025-03-14 | Stop reason: HOSPADM

## 2025-03-14 RX ORDER — ACETAMINOPHEN 650 MG/1
650 SUPPOSITORY RECTAL EVERY 6 HOURS PRN
Status: DISCONTINUED | OUTPATIENT
Start: 2025-03-14 | End: 2025-03-14 | Stop reason: HOSPADM

## 2025-03-14 RX ORDER — POTASSIUM CHLORIDE 1500 MG/1
40 TABLET, EXTENDED RELEASE ORAL PRN
Status: DISCONTINUED | OUTPATIENT
Start: 2025-03-14 | End: 2025-03-14 | Stop reason: HOSPADM

## 2025-03-14 RX ORDER — PREDNISONE 10 MG/1
40 TABLET ORAL DAILY
Qty: 20 TABLET | Refills: 0 | Status: SHIPPED | OUTPATIENT
Start: 2025-03-14 | End: 2025-03-19

## 2025-03-14 RX ORDER — POTASSIUM CHLORIDE 7.45 MG/ML
10 INJECTION INTRAVENOUS PRN
Status: DISCONTINUED | OUTPATIENT
Start: 2025-03-14 | End: 2025-03-14 | Stop reason: HOSPADM

## 2025-03-14 RX ORDER — SODIUM CHLORIDE 9 MG/ML
INJECTION, SOLUTION INTRAVENOUS CONTINUOUS
Status: DISCONTINUED | OUTPATIENT
Start: 2025-03-14 | End: 2025-03-14 | Stop reason: HOSPADM

## 2025-03-14 RX ORDER — ACETAMINOPHEN 325 MG/1
650 TABLET ORAL EVERY 6 HOURS PRN
Status: DISCONTINUED | OUTPATIENT
Start: 2025-03-14 | End: 2025-03-14 | Stop reason: HOSPADM

## 2025-03-14 RX ORDER — MAGNESIUM SULFATE IN WATER 40 MG/ML
2000 INJECTION, SOLUTION INTRAVENOUS PRN
Status: DISCONTINUED | OUTPATIENT
Start: 2025-03-14 | End: 2025-03-14 | Stop reason: HOSPADM

## 2025-03-14 RX ADMIN — LIDOCAINE HYDROCHLORIDE,EPINEPHRINE BITARTRATE 20 ML: 10; .01 INJECTION, SOLUTION INFILTRATION; PERINEURAL at 07:46

## 2025-03-14 RX ADMIN — SODIUM CHLORIDE: 0.9 INJECTION, SOLUTION INTRAVENOUS at 02:22

## 2025-03-14 RX ADMIN — SODIUM CHLORIDE 3000 MG: 9 INJECTION, SOLUTION INTRAVENOUS at 06:08

## 2025-03-14 RX ADMIN — SODIUM CHLORIDE 3000 MG: 9 INJECTION, SOLUTION INTRAVENOUS at 02:29

## 2025-03-14 RX ADMIN — DEXAMETHASONE SODIUM PHOSPHATE 10 MG: 10 INJECTION, SOLUTION INTRAMUSCULAR; INTRAVENOUS at 02:25

## 2025-03-14 ASSESSMENT — ENCOUNTER SYMPTOMS: FACIAL SWELLING: 1

## 2025-03-14 ASSESSMENT — PAIN - FUNCTIONAL ASSESSMENT: PAIN_FUNCTIONAL_ASSESSMENT: NONE - DENIES PAIN

## 2025-03-14 NOTE — ED PROVIDER NOTES
[facility] EMERGENCY DEPARTMENT  ED  Encounter Note  Admit Date/RoomTime: 3/14/2025  1:12 AM  ED Room: [place] [address]  NAME: Ricardo Foss  : 1970  MRN: [id number]  PCP: Yary Driver APRN - CNP     Independent KEY Visit.      [facility] EMERGENCY DEPARTMENT  EMERGENCY DEPARTMENT ENCOUNTER       Pt Name: Ricardo Foss  MRN: [id number]  Birthdate 1970  Date of evaluation: 3/13/2025  Provider: LAKHWINDER Ulrich CNP  PCP: Yary Driver APRN - CNP  Note Started: 5:18 PM EDT 3/13/25     CHIEF COMPLAINT             Chief Complaint   Patient presents with    Facial Swelling       Left sided facial swelling. Difficulty chewing.          HISTORY OF PRESENT ILLNESS: 1 or more Elements   History From: Patient  Limitations to history : None     Ricardo Foss is a 54 y.o. male who has a past medical history of hyperlipidemia, hypertension, type 2 diabetes mellitus, SMITA presents to the emergency department with a 2-week history of right-sided facial swelling with significant trismus.  Patient states that he had this happen a couple of years ago to him and was evaluated in his regular doctor's office, was thought at that time to be an allergic reaction to lisinopril.  He has not been on ACE inhibitor's.  States that he has a lot of pain and swelling on the right side of his jaw, has not been able to open his mouth due to significant trismus.  States he has not been able to eat much over the last couple of weeks.  Denies any drooling, difficulty swallowing, difficulty breathing.  Denies sore throat.  Denies any dental pain.     Nursing Notes were all reviewed and agreed with or any disagreements were addressed in the HPI.     REVIEW OF SYSTEMS :    Positives and Pertinent negatives as per HPI.      PAST MEDICAL HISTORY/Chronic Conditions Affecting Care   Past Medical History in prose (no negatives)    has a past medical history of  occassional few drinks on the weekends/stopped drinking    Drug use: Yes       Frequency: 1.0 times per week       Types: Marijuana (Weed)       Comment: occas.            SCREENINGS        Issa Coma Scale  Eye Opening: Spontaneous  Best Verbal Response: Oriented  Best Motor Response: Obeys commands  May Coma Scale Score: 15                CIWA Assessment  BP: (!) 158/106  Pulse: 74            PHYSICAL EXAM  1 or more Elements                ED Triage Vitals   BP Systolic BP Percentile Diastolic BP Percentile Temp Temp src Pulse Respirations SpO2   03/13/25 1715 -- -- 03/13/25 1712 -- 03/13/25 1712 03/13/25 1715 03/13/25 1712   (!) 158/106     97.3 °F (36.3 °C)   99 18 99 %       Height Weight - Scale               03/13/25 1715 03/13/25 1715               1.676 m (5' 6\") 98.9 kg (218 lb)                  Physical Exam  Constitutional:       Appearance: Normal appearance.   HENT:      Head: Normocephalic and atraumatic.      Jaw: Trismus, tenderness and swelling present.        Comments: Firm induration noted noted to the left mandibular area extending into the cheek over the parotid gland.  No tongue elevation, floor the mouth is soft.  Unable to fully visualize oropharynx secondary to significant trismus.  Cardiovascular:      Rate and Rhythm: Normal rate and regular rhythm.   Pulmonary:      Effort: Pulmonary effort is normal.      Breath sounds: Normal breath sounds.   Musculoskeletal:      Cervical back: Normal range of motion and neck supple.   Neurological:      General: No focal deficit present.      Mental Status: He is alert and oriented to person, place, and time.               DIAGNOSTIC RESULTS   LABS:           Labs Reviewed   CBC WITH AUTO DIFFERENTIAL - Abnormal; Notable for the following components:       Result Value      WBC 11.6 (*)       MCV 79.8 (*)       Lymphocytes % 18 (*)       Neutrophils Absolute 8.28 (*)       All other components within normal limits   BASIC METABOLIC PANEL -  states that he had this happen a couple of years ago to him and was evaluated in his regular doctor's office, was thought at that time to be an allergic reaction to lisinopril.  He has not been on ACE inhibitor's.  States that he has a lot of pain and swelling on the right side of his jaw, has not been able to open his mouth due to significant trismus.  States he has not been able to eat much over the last couple of weeks.  Denies any drooling, difficulty swallowing, difficulty breathing.  Denies sore throat.  Denies any dental pain.     Nursing Notes were all reviewed and agreed with or any disagreements were addressed in the HPI.    Here the patient had left previously signing out AMA after being seen and evaluated.  He was previously given Unasyn as well as Decadron with ENT consult and recommendations for medical admission of Unasyn every 6 hours and Decadron every 8 hours.    Patient has previously signed out AMA and he is returning for admission.  Will reach out to the hospitalist for admission    Discussed with Dr. Macias, will admit    Plan of Care/Counseling:  LAKHWINDER Ulrich CNP reviewed today's visit with the patient in addition to providing specific details for the plan of care and counseling regarding the diagnosis and prognosis.  Questions are answered at this time and are agreeable with the plan.    ASSESSMENT     1. Parotid abscess    2. Trismus        DISPOSITION   Inpatient Admission to General Medical Floor.  Patient condition is stable    Discharge Instructions:   Patient referred to  No follow-up provider specified.  NEW MEDICATIONS     New Prescriptions    No medications on file     Electronically signed by LAKHWINDER Ulrich CNP   DD: 3/14/25  **This report was transcribed using voice recognition software. Every effort was made to ensure accuracy; however, inadvertent computerized transcription errors may be present.  END OF ED PROVIDER NOTE       Clarissa Kennedy APRN -  CNP  03/14/25 0142

## 2025-03-14 NOTE — TELEPHONE ENCOUNTER
Called patient to schedule hospital follow up. Patient reports he is going out of town, not able to schedule until Thursday 3/20/25. Dr. Masterson notified

## 2025-03-14 NOTE — CONSULTS
OTOLARYNGOLOGY  CONSULT NOTE  3/14/2025    Physician Consulted: Dr. Kim  Reason for Consult: facial abscess  Referring Physician: Dr. Mauro WOODWARD  Ricardo Foss is a 54 y.o. male who ENT was consulted for evaluation of left sided facial swelling. Patient reports 2 week history of left sided facial swelling and pain. Denies any recent dental work or dental pain. Denies trauma to his face. Reports similar symptoms about 6 months ago which went away on their own. He has been on antibiotics for the past week without improvement of symptoms. He reports difficulty opening his mouth, but denies trouble swallowing, breathing or speaking. No fevers/chills at home. CT neck done in the ED revealed Extending from the anterior aspect of the left  parotid gland and lateral margin of the left masseter muscle, there is a 4.2  x 1.2 cm curvilinear peripherally enhancing, centrally cystic lesion.    Review of Systems   Constitutional:  Negative for chills and fever.   HENT:  Positive for facial swelling. Negative for dental problem.    All other systems reviewed and are negative.      Past Medical History:   Diagnosis Date    Hyperlipidemia     Hypertension     Type II or unspecified type diabetes mellitus without mention of complication, not stated as uncontrolled     Hx [facility] in 10/2010 HbA1C 13    Unspecified sleep apnea 2005    using CPAP 14cm H2O       History reviewed. No pertinent surgical history.    Medications Prior to Admission:    Prior to Admission medications    Medication Sig Start Date End Date Taking? Authorizing Provider   isosorbide dinitrate (ISORDIL) 10 MG tablet Take 1 tablet by mouth 3 times daily 12/23/24   Carolyn Gaspar, APRN - CNP   chlorthalidone (HYGROTON) 25 MG tablet Take 1 tablet by mouth daily    ProviderMavis MD   rosuvastatin (CRESTOR) 10 MG tablet Take 1 tablet by mouth daily    ProviderMavis MD   vitamin D 50 MCG (2000 UT) CAPS capsule Take 1 capsule by mouth daily     Provider, MD Mavis   losartan (COZAAR) 100 MG tablet Take 1 tablet by mouth daily 24   Carolyn Gaspar APRN - CNP   hydrALAZINE (APRESOLINE) 25 MG tablet Take 1 tablet by mouth 3 times daily 24   Carolyn Gaspar APRN - CNP   empagliflozin (JARDIANCE) 10 MG tablet Take 1 tablet by mouth daily Not taking cannot afford  Patient not taking: Reported on 2024    Mavis Scott MD   doxazosin (CARDURA) 4 MG tablet Take 1 tablet by mouth nightly    Mavis Scott MD   metoprolol succinate (TOPROL XL) 25 MG extended release tablet Take 1 tablet by mouth 2 times daily    ProviderMavis MD   amLODIPine (NORVASC) 10 MG tablet Take 1 tablet by mouth daily 6/10/19   Wail Jenkins MD       Allergies   Allergen Reactions    Lisinopril Swelling     angioedema    Bee Venom Swelling       Family History   Problem Relation Age of Onset    Diabetes Father     Diabetes Brother        Social History     Tobacco Use    Smoking status: Some Days     Current packs/day: 0.00     Average packs/day: 0.5 packs/day for 10.0 years (5.0 ttl pk-yrs)     Types: Cigarettes     Start date: 2004     Last attempt to quit: 2014     Years since quittin.0    Smokeless tobacco: Never    Tobacco comments:     smokes 5 cigarettes daily   Vaping Use    Vaping status: Former   Substance Use Topics    Alcohol use: Yes     Alcohol/week: 4.2 standard drinks of alcohol     Types: 5 Standard drinks or equivalent per week     Comment: occassional few drinks on the weekends/stopped drinking    Drug use: Yes     Frequency: 1.0 times per week     Types: Marijuana (Weed)     Comment: occas.           PHYSICAL EXAM:    Vitals:    25 0324   BP: (!) 147/80   Pulse: 71   Resp: 21   Temp:    SpO2: 98%       General Appearance:  Laying in bed, awake, alert, no apparent distress  Head/face:  NC/AT  Eyes: PERRL, EOMI  ENT: Bilateral external ears WNL, Nares patent, Septum midline, left parotid/masseter space

## 2025-03-14 NOTE — H&P
Hospitalist History & Physical      PCP: Yary Driver, APRN - CNP    Date of Service: Pt seen/examined on 3/14/2025     Chief Complaint:  had concerns including Abscess (On parotid gland; was going to be admitted but needed to take care of things and come back...).    History Of Present Illness:    Mr. Ricardo Foss, a 54 y.o. year old male  who  has a past medical history of Hyperlipidemia, Hypertension, Type II or unspecified type diabetes mellitus without mention of complication, not stated as uncontrolled, and Unspecified sleep apnea.     Patient presented to the emergency department with 2-week history of left-sided facial swelling and significant trismus.  Patient reports a lot of pain and swelling to the right side of his jaw and has not been able to open his mouth very well.  He is protecting his airway.  Apparently this has happened before in the past.  He was here earlier and left AMA after getting initial doses of his Unasyn and Decadron.  Vital signs within normal limits and stable.  The patient is afebrile.  Laboratory studies demonstrate a WBC of 11.6.  CT of the head and neck shows 4.2 x 1.2 cm curvilinear peripherally enhancing centrally cystic lesion extending from the anterior aspect of the left parotid gland and lateral margin of the left masseter muscle.  Concerning for abscess.  ENT was contacted by emergency department physician who advised starting him on Unasyn and Decadron and will see him in the morning to see if there is anything they can drain at the bedside or if necessary in the OR.      Past Medical History:   Diagnosis Date    Hyperlipidemia     Hypertension     Type II or unspecified type diabetes mellitus without mention of complication, not stated as uncontrolled     Hx [facility] in 10/2010 HbA1C 13    Unspecified sleep apnea 2005    using CPAP 14cm H2O       History reviewed. No pertinent surgical history.    Prior to Admission medications    Medication Sig Start Date End      +++++++++++++++++++++++++++++++++++++++++++++++++  Tommy West, DO  +++++++++++++++++++++++++++++++++++++++++++++++++  NOTE: This report was transcribed using voice recognition software. Every effort was made to ensure accuracy; however, inadvertent computerized transcription errors may be present.

## 2025-03-14 NOTE — DISCHARGE SUMMARY
Hospitalist Discharge Summary    Patient ID: Ricardo Foss   Patient : 1970  Patient's PCP: Yary Driver, APRN - CNP    Admit Date: 3/14/2025   Admitting Physician: Tommy West,     Discharge Date:  3/14/2025   Discharge Physician: Umu Wade MD   Discharge Condition: Stable  Discharge Disposition: Home      Hospital course in brief:  (Please refer to daily progress notes for a comprehensive review of the hospitalization by requesting medical records)    54 y.o. year old male  who  has a past medical history of Hyperlipidemia, Hypertension, Type II or unspecified type diabetes mellitus without mention of complication, not stated as uncontrolled, and Unspecified sleep apnea.      Patient presented to the emergency department with 2-week history of left-sided facial swelling and significant trismus.  Patient reports a lot of pain and swelling to the right side of his jaw and has not been able to open his mouth very well.  He is protecting his airway.  Apparently this has happened before in the past.  He was here earlier and left AMA after getting initial doses of his Unasyn and Decadron.  Vital signs within normal limits and stable.  The patient is afebrile.  Laboratory studies demonstrate a WBC of 11.6.  CT of the head and neck shows 4.2 x 1.2 cm curvilinear peripherally enhancing centrally cystic lesion extending from the anterior aspect of the left parotid gland and lateral margin of the left masseter muscle.  Concerning for abscess.  ENT was contacted by emergency department physician who advised starting him on Unasyn and Decadron and will see him in the morning to see if there is anything they can drain at the bedside or if necessary in the OR  Ent PERFORMED BEDSIDE I AND d AND RECOMMENDED TO KEEP PACKING IN PLACE UNTIL FOLLOW UP IN ENT OFFICE ON MONDAY  po abx in the form of Keflex BID x 7 days and would also recommend 40 mg prednisone x 5 days for swelling and trismus.   labs:  CBC:   Recent Labs     03/13/25  1721   WBC 11.6*   RBC 4.85   HGB 12.6   HCT 38.7   MCV 79.8*   RDW 14.1        BMP:   Recent Labs     03/13/25  1721      K 4.2      CO2 24   BUN 10   CREATININE 1.0     LFT:  No results for input(s): \"ALKPHOS\", \"ALT\", \"AST\", \"BILITOT\", \"AMYLASE\", \"LIPASE\" in the last 72 hours.    Invalid input(s): \"PROT\", \"ALB\"  PT/INR: No results for input(s): \"INR\", \"APTT\" in the last 72 hours.  BNP: No results for input(s): \"BNP\" in the last 72 hours.  Hgb A1C:   Lab Results   Component Value Date    LABA1C 6.9 02/26/2019     Folate and B12: No results found for: \"ASFQDJCQ92\", No results found for: \"FOLATE\"  Thyroid Studies: No results found for: \"TSH\", \"P8DGACE\", \"THYROIDAB\"    Urinalysis:    Lab Results   Component Value Date/Time    NITRU Negative 04/26/2021 06:38 AM    WBCUA 0-1 04/26/2021 06:38 AM    WBCUA 0-1 05/22/2012 02:30 PM    BACTERIA NONE SEEN 04/26/2021 06:38 AM    RBCUA NONE 04/26/2021 06:38 AM    RBCUA 1-3 03/19/2013 12:12 PM    BLOODU TRACE-INTACT 04/26/2021 06:38 AM    GLUCOSEU Negative 04/26/2021 06:38 AM    GLUCOSEU NEGATIVE 05/22/2012 02:30 PM       Imaging:  CT SOFT TISSUE NECK W CONTRAST  Result Date: 3/13/2025  EXAMINATION: CT OF THE NECK SOFT TISSUE WITH CONTRAST  3/13/2025 TECHNIQUE: CT of the neck was performed with the administration of intravenous contrast. Multiplanar reformatted images are provided for review. Automated exposure control, iterative reconstruction, and/or weight based adjustment of the mA/kV was utilized to reduce the radiation dose to as low as reasonably achievable. COMPARISON: None. HISTORY: ORDERING SYSTEM PROVIDED HISTORY: Left sided facial mass, ?Parotits vs parotid mass TECHNOLOGIST PROVIDED HISTORY: Reason for exam:->Left sided facial mass, ?Parotits vs parotid mass Additional Contrast?->1 What reading provider will be dictating this exam?->CRC FINDINGS: PHARYNX/LARYNX:  The tonsillar pillars are normal in

## 2025-03-14 NOTE — DISCHARGE INSTRUCTIONS
Wash face at least twice daily with warm soap and water and keep covered with gauze  Follow up with Dr. Kim as scheduled  Take abx and steroids as prescribed

## 2025-03-14 NOTE — TELEPHONE ENCOUNTER
Called patient back and advised he was scheduled with the incorrect physician. Advised he needed to be scheduled with Dr. Kim. Offered next week appointment in the [place] office. Patient became upset and yelling at this nurse that he would not be traveling outside of [facility]. This nurse consulted with KAITLYNN Doherty for patient to be scheduled the following Monday in the [place] office. Patient agreeable with switching appointment to the following Monday in the [place] office.

## 2025-03-14 NOTE — TELEPHONE ENCOUNTER
----- Message from Dr. Julio Masterson, DO sent at 3/14/2025  8:20 AM EDT -----  Regarding: Follow up Monday 3/17 for I&D left facial abscess  Thanks!

## 2025-03-14 NOTE — DISCHARGE INSTRUCTIONS
You are leaving against medical advice.  We recommend admission.  Please return to complete your care

## 2025-03-16 LAB
MICROORGANISM SPEC CULT: NO GROWTH
MICROORGANISM SPEC CULT: NORMAL
MICROORGANISM/AGENT SPEC: NORMAL
SPECIMEN DESCRIPTION: NORMAL
SPECIMEN DESCRIPTION: NORMAL

## 2025-03-20 LAB
MICROORGANISM SPEC CULT: ABNORMAL
SPECIMEN DESCRIPTION: ABNORMAL

## 2025-03-24 ENCOUNTER — OFFICE VISIT (OUTPATIENT)
Dept: ENT CLINIC | Age: 55
End: 2025-03-24

## 2025-03-24 VITALS — HEIGHT: 66 IN | BODY MASS INDEX: 34.1 KG/M2 | WEIGHT: 212.2 LBS

## 2025-03-24 DIAGNOSIS — L02.01 FACIAL ABSCESS: Primary | ICD-10-CM

## 2025-03-24 PROCEDURE — 99213 OFFICE O/P EST LOW 20 MIN: CPT | Performed by: OTOLARYNGOLOGY

## 2025-03-24 ASSESSMENT — ENCOUNTER SYMPTOMS
SINUS PRESSURE: 0
SHORTNESS OF BREATH: 0
TROUBLE SWALLOWING: 0
SINUS PAIN: 0
FACIAL SWELLING: 1

## 2025-03-24 ASSESSMENT — VISUAL ACUITY: OU: 1

## 2025-03-24 NOTE — PROGRESS NOTES
Physician Progress Note      PATIENT:               DHARMESH ODONNELL  CSN #:                  [id number]  :                       1970  ADMIT DATE:       3/14/2025 1:12 AM  DISCH DATE:        3/14/2025 9:23 AM  RESPONDING  PROVIDER #:        Elfego Kim DO          QUERY TEXT:    Pt. admitted for facial abscess. Per Op note dated 3/14 documentation of I&D.   To accurately reflect procedure performed please specify depth.    The medical record reflects the following:  Risk Factors: facial abscess  Clinical Indicators: Per 3/14 note: Once the anesthetic was working a#11 blade   scalpel was used to make an opening in the apex of the abscess.  Purulent   material was exuded and it was cultured.  Pressure was applied to the abscess   until there was little to no drainage left.  Wound was irrigated with betadine   solution and iodoform gauze was packed into wound. A dressing was placed over   the abscess and pt tolerated procedure well.  Treatment: I&D,  Unasyn and Decadron  Options provided:  -- I&D down to facial subcutaneous tissue and fascia  -- I&D down to facial bone  -- I&D down to level of muscle  -- Other - I will add my own diagnosis  -- Disagree - Not applicable / Not valid  -- Disagree - Clinically unable to determine / Unknown  -- Refer to Clinical Documentation Reviewer    PROVIDER RESPONSE TEXT:    Addendum to 3/14 procedure note: I&D down to facial subcutaneous tissue and   fascia    Query created by: Tyrel Valdez on 3/18/2025 1:47 PM      Electronically signed by:  Elfego Kim DO 3/24/2025 7:27 AM

## 2025-03-24 NOTE — PROGRESS NOTES
[facility]  [facility]. Ms.Ed.  New Consult       Patient Name:  Ricardo Foss  :  1970     CHIEF C/O:    Chief Complaint   Patient presents with    Follow-up     ED follow up for L facial abscess, that hospital cleaned. Patient states no pain present, no smell or any infection present to patient's knowledge. Reports that he is changing his bandage 2x daily        HISTORY OBTAINED FROM:  patient    HISTORY OF PRESENT ILLNESS:       Ricardo is a 55 y.o. year old male, here today for:       HPI   Reports iodoform fell out yesterday. ED for L facial parotid abscess. Denied dysphagia, pain, fever, chills, shortness of breath.  Patient completed antibiotic prednisone. Has gone down in size.     Past Medical History:   Diagnosis Date    Hyperlipidemia     Hypertension     Type II or unspecified type diabetes mellitus without mention of complication, not stated as uncontrolled     Hx [facility] in 10/2010 HbA1C 13    Unspecified sleep apnea     using CPAP 14cm H2O     History reviewed. No pertinent surgical history.    Current Outpatient Medications:     isosorbide dinitrate (ISORDIL) 10 MG tablet, Take 1 tablet by mouth 3 times daily, Disp: 90 tablet, Rfl: 3    chlorthalidone (HYGROTON) 25 MG tablet, Take 1 tablet by mouth daily, Disp: , Rfl:     rosuvastatin (CRESTOR) 10 MG tablet, Take 1 tablet by mouth daily, Disp: , Rfl:     vitamin D 50 MCG ( [place]) CAPS capsule, Take 1 capsule by mouth daily, Disp: , Rfl:     losartan (COZAAR) 100 MG tablet, Take 1 tablet by mouth daily, Disp: 90 tablet, Rfl: 3    hydrALAZINE (APRESOLINE) 25 MG tablet, Take 1 tablet by mouth 3 times daily, Disp: 90 tablet, Rfl: 3    empagliflozin (JARDIANCE) 10 MG tablet, Take 1 tablet by mouth daily Not taking cannot afford, Disp: , Rfl:     doxazosin (CARDURA) 4 MG tablet, Take 1 tablet by mouth nightly, Disp: , Rfl:     metoprolol succinate (TOPROL XL) 25 MG extended release tablet, Take 1 tablet by mouth 2 times

## 2025-06-13 ENCOUNTER — TRANSCRIBE ORDERS (OUTPATIENT)
Dept: ADMINISTRATIVE | Age: 55
End: 2025-06-13

## 2025-06-13 DIAGNOSIS — K11.3 ABSCESS OF SALIVARY GLAND: Primary | ICD-10-CM
